# Patient Record
Sex: FEMALE | Race: BLACK OR AFRICAN AMERICAN | NOT HISPANIC OR LATINO | URBAN - METROPOLITAN AREA
[De-identification: names, ages, dates, MRNs, and addresses within clinical notes are randomized per-mention and may not be internally consistent; named-entity substitution may affect disease eponyms.]

---

## 2018-11-16 ENCOUNTER — EMERGENCY (EMERGENCY)
Facility: HOSPITAL | Age: 38
LOS: 1 days | Discharge: HOME | End: 2018-11-16
Attending: EMERGENCY MEDICINE | Admitting: EMERGENCY MEDICINE
Payer: COMMERCIAL

## 2018-11-16 VITALS
SYSTOLIC BLOOD PRESSURE: 131 MMHG | OXYGEN SATURATION: 100 % | RESPIRATION RATE: 18 BRPM | TEMPERATURE: 99 F | HEART RATE: 77 BPM | DIASTOLIC BLOOD PRESSURE: 86 MMHG

## 2018-11-16 VITALS
SYSTOLIC BLOOD PRESSURE: 153 MMHG | RESPIRATION RATE: 18 BRPM | DIASTOLIC BLOOD PRESSURE: 105 MMHG | OXYGEN SATURATION: 100 % | TEMPERATURE: 98 F | HEART RATE: 75 BPM

## 2018-11-16 PROCEDURE — 73552 X-RAY EXAM OF FEMUR 2/>: CPT | Mod: 26,LT

## 2018-11-16 PROCEDURE — 71101 X-RAY EXAM UNILAT RIBS/CHEST: CPT | Mod: 26,LT

## 2018-11-16 PROCEDURE — 73562 X-RAY EXAM OF KNEE 3: CPT | Mod: 26,LT

## 2018-11-16 PROCEDURE — 73502 X-RAY EXAM HIP UNI 2-3 VIEWS: CPT | Mod: 26,LT

## 2018-11-16 PROCEDURE — 73030 X-RAY EXAM OF SHOULDER: CPT | Mod: 26,LT

## 2018-11-16 PROCEDURE — 99284 EMERGENCY DEPT VISIT MOD MDM: CPT

## 2018-11-16 RX ORDER — CYCLOBENZAPRINE HYDROCHLORIDE 10 MG/1
1 TABLET, FILM COATED ORAL
Qty: 12 | Refills: 0
Start: 2018-11-16 | End: 2018-11-19

## 2018-11-16 RX ORDER — IBUPROFEN 200 MG
600 TABLET ORAL ONCE
Qty: 0 | Refills: 0 | Status: COMPLETED | OUTPATIENT
Start: 2018-11-16 | End: 2018-11-16

## 2018-11-16 RX ORDER — CYCLOBENZAPRINE HYDROCHLORIDE 10 MG/1
10 TABLET, FILM COATED ORAL ONCE
Qty: 0 | Refills: 0 | Status: COMPLETED | OUTPATIENT
Start: 2018-11-16 | End: 2018-11-16

## 2018-11-16 RX ADMIN — CYCLOBENZAPRINE HYDROCHLORIDE 10 MILLIGRAM(S): 10 TABLET, FILM COATED ORAL at 21:11

## 2018-11-16 RX ADMIN — Medication 600 MILLIGRAM(S): at 21:13
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
## 2018-11-16 NOTE — ED ADULT NURSE NOTE - NSIMPLEMENTINTERV_GEN_ALL_ED
Implemented All Universal Safety Interventions:  [place] to call system. Call bell, personal items and telephone within reach. Instruct patient to call for assistance. Room bathroom lighting operational. Non-slip footwear when patient is off stretcher. Physically safe environment: no spills, clutter or unnecessary equipment. Stretcher in lowest position, wheels locked, appropriate side rails in place.
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
## 2018-11-16 NOTE — ED PROVIDER NOTE - CONSTITUTIONAL, MLM
normal... Well appearing, well nourished, awake, alert, oriented to person, place, time/situation and in no apparent distress. GCS 15. Ambulatory.
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
## 2019-11-10 ENCOUNTER — EMERGENCY (EMERGENCY)
Facility: HOSPITAL | Age: 39
LOS: 1 days | Discharge: HOME | End: 2019-11-10
Attending: EMERGENCY MEDICINE | Admitting: EMERGENCY MEDICINE
Payer: COMMERCIAL

## 2019-11-10 VITALS
SYSTOLIC BLOOD PRESSURE: 147 MMHG | HEART RATE: 65 BPM | DIASTOLIC BLOOD PRESSURE: 86 MMHG | RESPIRATION RATE: 18 BRPM | TEMPERATURE: 98 F | OXYGEN SATURATION: 100 %

## 2019-11-10 LAB
ALBUMIN SERPL ELPH-MCNC: 4.2 G/DL — SIGNIFICANT CHANGE UP (ref 3.3–5)
ALP SERPL-CCNC: 74 U/L — SIGNIFICANT CHANGE UP (ref 40–120)
ALT FLD-CCNC: 4 U/L — SIGNIFICANT CHANGE UP (ref 4–33)
ANION GAP SERPL CALC-SCNC: 8 MMO/L — SIGNIFICANT CHANGE UP (ref 7–14)
ANISOCYTOSIS BLD QL: SLIGHT — SIGNIFICANT CHANGE UP
APTT BLD: 35.5 SEC — SIGNIFICANT CHANGE UP (ref 27.5–36.3)
AST SERPL-CCNC: 17 U/L — SIGNIFICANT CHANGE UP (ref 4–32)
BASOPHILS # BLD AUTO: 0.04 K/UL — SIGNIFICANT CHANGE UP (ref 0–0.2)
BASOPHILS NFR BLD AUTO: 0.5 % — SIGNIFICANT CHANGE UP (ref 0–2)
BASOPHILS NFR SPEC: 0.9 % — SIGNIFICANT CHANGE UP (ref 0–2)
BILIRUB SERPL-MCNC: 0.3 MG/DL — SIGNIFICANT CHANGE UP (ref 0.2–1.2)
BLASTS # FLD: 0 % — SIGNIFICANT CHANGE UP (ref 0–0)
BUN SERPL-MCNC: 9 MG/DL — SIGNIFICANT CHANGE UP (ref 7–23)
CALCIUM SERPL-MCNC: 9.4 MG/DL — SIGNIFICANT CHANGE UP (ref 8.4–10.5)
CHLORIDE SERPL-SCNC: 106 MMOL/L — SIGNIFICANT CHANGE UP (ref 98–107)
CO2 SERPL-SCNC: 25 MMOL/L — SIGNIFICANT CHANGE UP (ref 22–31)
CREAT SERPL-MCNC: 0.86 MG/DL — SIGNIFICANT CHANGE UP (ref 0.5–1.3)
DACRYOCYTES BLD QL SMEAR: SLIGHT — SIGNIFICANT CHANGE UP
EOSINOPHIL # BLD AUTO: 0.17 K/UL — SIGNIFICANT CHANGE UP (ref 0–0.5)
EOSINOPHIL NFR BLD AUTO: 2.1 % — SIGNIFICANT CHANGE UP (ref 0–6)
EOSINOPHIL NFR FLD: 2.7 % — SIGNIFICANT CHANGE UP (ref 0–6)
GIANT PLATELETS BLD QL SMEAR: PRESENT — SIGNIFICANT CHANGE UP
GLUCOSE SERPL-MCNC: 101 MG/DL — HIGH (ref 70–99)
HCG SERPL-ACNC: < 5 MIU/ML — SIGNIFICANT CHANGE UP
HCT VFR BLD CALC: 30.7 % — LOW (ref 34.5–45)
HGB BLD-MCNC: 8.7 G/DL — LOW (ref 11.5–15.5)
HYPOCHROMIA BLD QL: SIGNIFICANT CHANGE UP
IMM GRANULOCYTES NFR BLD AUTO: 0.2 % — SIGNIFICANT CHANGE UP (ref 0–1.5)
INR BLD: 1.18 — HIGH (ref 0.88–1.17)
LYMPHOCYTES # BLD AUTO: 1.78 K/UL — SIGNIFICANT CHANGE UP (ref 1–3.3)
LYMPHOCYTES # BLD AUTO: 22.2 % — SIGNIFICANT CHANGE UP (ref 13–44)
LYMPHOCYTES NFR SPEC AUTO: 24.8 % — SIGNIFICANT CHANGE UP (ref 13–44)
MACROCYTES BLD QL: SLIGHT — SIGNIFICANT CHANGE UP
MCHC RBC-ENTMCNC: 20.6 PG — LOW (ref 27–34)
MCHC RBC-ENTMCNC: 28.3 % — LOW (ref 32–36)
MCV RBC AUTO: 72.7 FL — LOW (ref 80–100)
METAMYELOCYTES # FLD: 0 % — SIGNIFICANT CHANGE UP (ref 0–1)
MICROCYTES BLD QL: SLIGHT — SIGNIFICANT CHANGE UP
MONOCYTES # BLD AUTO: 0.49 K/UL — SIGNIFICANT CHANGE UP (ref 0–0.9)
MONOCYTES NFR BLD AUTO: 6.1 % — SIGNIFICANT CHANGE UP (ref 2–14)
MONOCYTES NFR BLD: 2.8 % — SIGNIFICANT CHANGE UP (ref 2–9)
MYELOCYTES NFR BLD: 0 % — SIGNIFICANT CHANGE UP (ref 0–0)
NEUTROPHIL AB SER-ACNC: 62.4 % — SIGNIFICANT CHANGE UP (ref 43–77)
NEUTROPHILS # BLD AUTO: 5.52 K/UL — SIGNIFICANT CHANGE UP (ref 1.8–7.4)
NEUTROPHILS NFR BLD AUTO: 68.9 % — SIGNIFICANT CHANGE UP (ref 43–77)
NEUTS BAND # BLD: 0 % — SIGNIFICANT CHANGE UP (ref 0–6)
NRBC # FLD: 0 K/UL — SIGNIFICANT CHANGE UP (ref 0–0)
OTHER - HEMATOLOGY %: 1.8 — SIGNIFICANT CHANGE UP
OVALOCYTES BLD QL SMEAR: SLIGHT — SIGNIFICANT CHANGE UP
PLATELET # BLD AUTO: 300 K/UL — SIGNIFICANT CHANGE UP (ref 150–400)
PLATELET COUNT - ESTIMATE: NORMAL — SIGNIFICANT CHANGE UP
PMV BLD: SIGNIFICANT CHANGE UP FL (ref 7–13)
POIKILOCYTOSIS BLD QL AUTO: SIGNIFICANT CHANGE UP
POLYCHROMASIA BLD QL SMEAR: SLIGHT — SIGNIFICANT CHANGE UP
POTASSIUM SERPL-MCNC: 3.8 MMOL/L — SIGNIFICANT CHANGE UP (ref 3.5–5.3)
POTASSIUM SERPL-SCNC: 3.8 MMOL/L — SIGNIFICANT CHANGE UP (ref 3.5–5.3)
PROMYELOCYTES # FLD: 0 % — SIGNIFICANT CHANGE UP (ref 0–0)
PROT SERPL-MCNC: 7.8 G/DL — SIGNIFICANT CHANGE UP (ref 6–8.3)
PROTHROM AB SERPL-ACNC: 13.5 SEC — HIGH (ref 9.8–13.1)
RBC # BLD: 4.22 M/UL — SIGNIFICANT CHANGE UP (ref 3.8–5.2)
RBC # FLD: 20.4 % — HIGH (ref 10.3–14.5)
SCHISTOCYTES BLD QL AUTO: SLIGHT — SIGNIFICANT CHANGE UP
SMUDGE CELLS # BLD: PRESENT — SIGNIFICANT CHANGE UP
SODIUM SERPL-SCNC: 139 MMOL/L — SIGNIFICANT CHANGE UP (ref 135–145)
TARGETS BLD QL SMEAR: SLIGHT — SIGNIFICANT CHANGE UP
TROPONIN T, HIGH SENSITIVITY: < 6 NG/L — SIGNIFICANT CHANGE UP (ref ?–14)
VARIANT LYMPHS # BLD: 4.6 % — SIGNIFICANT CHANGE UP
WBC # BLD: 8.02 K/UL — SIGNIFICANT CHANGE UP (ref 3.8–10.5)
WBC # FLD AUTO: 8.02 K/UL — SIGNIFICANT CHANGE UP (ref 3.8–10.5)

## 2019-11-10 PROCEDURE — 99285 EMERGENCY DEPT VISIT HI MDM: CPT | Mod: 25

## 2019-11-10 PROCEDURE — 93010 ELECTROCARDIOGRAM REPORT: CPT

## 2019-11-10 PROCEDURE — 71275 CT ANGIOGRAPHY CHEST: CPT | Mod: 26

## 2019-11-10 RX ORDER — ACETAMINOPHEN 500 MG
650 TABLET ORAL ONCE
Refills: 0 | Status: COMPLETED | OUTPATIENT
Start: 2019-11-10 | End: 2019-11-10

## 2019-11-10 RX ORDER — MORPHINE SULFATE 50 MG/1
2 CAPSULE, EXTENDED RELEASE ORAL ONCE
Refills: 0 | Status: DISCONTINUED | OUTPATIENT
Start: 2019-11-10 | End: 2019-11-10

## 2019-11-10 RX ADMIN — Medication 650 MILLIGRAM(S): at 20:43

## 2019-11-10 RX ADMIN — MORPHINE SULFATE 2 MILLIGRAM(S): 50 CAPSULE, EXTENDED RELEASE ORAL at 21:28

## 2019-11-10 NOTE — ED ADULT NURSE NOTE - OBJECTIVE STATEMENT
pt brought into intake room 5 A&OX4 amb self care female presents to the ed today for left sided non radiating chest pain. left sided with pleuritic pain.   No associated nausea/vomit/sob or palpitations. no significant medical hx. 18g iv placed in right ac. labs drawn and sent,

## 2019-11-10 NOTE — ED PROVIDER NOTE - NSFOLLOWUPINSTRUCTIONS_ED_ALL_ED_FT
Have blood pressure rechecked within one week. It was high in ED  Return to ED for any worsening of symptoms  Follow up with own doctor for low hemoglobin.

## 2019-11-10 NOTE — ED PROVIDER NOTE - NSFOLLOWUPCLINICS_GEN_ALL_ED_FT
[facility]  Cardiology  [address] [place] [id number]  Phone: [phone]  Fax:   Follow Up Time:

## 2019-11-10 NOTE — ED PROVIDER NOTE - PHYSICAL EXAMINATION
ambulatory, non toxic female. mmm.  normal S1-S2 tender under the left breast. no rash on chest wall.   No resp distress. able to speak in full and clear sentences. no wheeze, rales or stridor.  soft nontender abdomen. no  rebound. no guarding. no sign of trauma. no CVAT   no pedal edema. no calf tenderness. normal pulses bilateral feet.  AO3

## 2019-11-10 NOTE — ED PROVIDER NOTE - PATIENT PORTAL LINK FT
You can access the FollowMyHealth Patient Portal offered by [facility] by registering at the following website: [email or web address]. By joining [employer]’s FollowMyHealth portal, you will also be able to view your health information using other applications (apps) compatible with our system.

## 2019-11-10 NOTE — ED PROVIDER NOTE - PROGRESS NOTE DETAILS
pt states she has a hx of anemia with hemoglobin as low as 6 and max of 8. pt states she is on iron. never had a transfusion. informed of hemoglobin today, states "that's normal for me" pending rest of labs and ct to be done. pt no distress aware pending ct read pt no sob, cp ok. Given copies of results. CTneg for PE. Advise close pmd followup. Given cardiology list and number. pt comfortable w dc home. Advise recheck BP in one week

## 2019-11-10 NOTE — ED PROVIDER NOTE - OBJECTIVE STATEMENT
38yo F no sig pmhx pw chest pain. Located on the left side since this morning, +pleuritic, non exertional, non radiating, constant pain today. No associated nausea/vomit/sob or palpitations. no travel. no trauma. +IUD. non smoker.no drug use. no vaping.  no sig family hx.  no abdominal pain. Hasn't taken any pain meds.

## 2019-11-11 VITALS
OXYGEN SATURATION: 100 % | DIASTOLIC BLOOD PRESSURE: 79 MMHG | HEART RATE: 62 BPM | TEMPERATURE: 100 F | RESPIRATION RATE: 17 BRPM | SYSTOLIC BLOOD PRESSURE: 121 MMHG

## 2021-02-23 ENCOUNTER — APPOINTMENT (OUTPATIENT)
Dept: VASCULAR SURGERY | Facility: CLINIC | Age: 41
End: 2021-02-23
Payer: COMMERCIAL

## 2021-02-23 ENCOUNTER — NON-APPOINTMENT (OUTPATIENT)
Age: 41
End: 2021-02-23

## 2021-02-23 VITALS
SYSTOLIC BLOOD PRESSURE: 135 MMHG | DIASTOLIC BLOOD PRESSURE: 84 MMHG | HEART RATE: 80 BPM | WEIGHT: 162 LBS | BODY MASS INDEX: 24.55 KG/M2 | HEIGHT: 68 IN

## 2021-02-23 VITALS — TEMPERATURE: 97.1 F

## 2021-02-23 PROCEDURE — 93978 VASCULAR STUDY: CPT

## 2021-02-23 PROCEDURE — 99203 OFFICE O/P NEW LOW 30 MIN: CPT

## 2021-02-23 PROCEDURE — 99072 ADDL SUPL MATRL&STAF TM PHE: CPT

## 2021-02-23 NOTE — PHYSICAL EXAM
[JVD] : no jugular venous distention  [Normal Breath Sounds] : Normal breath sounds [Normal Rate and Rhythm] : normal rate and rhythm [2+] : left 2+ [Varicose Veins Of Lower Extremities] : not present [Ankle Swelling (On Exam)] : not present [] : not present [Abdomen Tenderness] : ~T ~M No abdominal tenderness [No Rash or Lesion] : No rash or lesion [Skin Ulcer] : no ulcer [Alert] : alert [Calm] : calm [de-identified] : Appears well

## 2021-02-23 NOTE — ASSESSMENT
[FreeTextEntry1] : In the office today patient underwent an aortic duplex study which does confirm the infrarenal dissection.  Patient with no evidence of ischemia at this time.  She will return to the office after retrieving the outside CT scan.  For now would recommend follow-up in 6 months with aortic duplex.  No need to start antihypertensives as patient does not have hypertension at this time.

## 2021-02-23 NOTE — HISTORY OF PRESENT ILLNESS
[FreeTextEntry1] : 41-year-old female with no significant medical history presents to the office with a report of a infrarenal aortic dissection.  The initial CT scan was performed for evaluation of fibroids.  Patient has no history of trauma.  She has no history of hypertension aside from hypertension during pregnancy several years ago.  Patient states she never had any episodes of back or abdominal pain.  There is no evidence that the patient has a history of Marfan syndrome or any other connective tissue disorders.  She is here for evaluation.

## 2021-07-20 ENCOUNTER — APPOINTMENT (OUTPATIENT)
Dept: VASCULAR SURGERY | Facility: CLINIC | Age: 41
End: 2021-07-20
Payer: COMMERCIAL

## 2021-07-20 ENCOUNTER — OUTPATIENT (OUTPATIENT)
Dept: OUTPATIENT SERVICES | Facility: HOSPITAL | Age: 41
LOS: 1 days | End: 2021-07-20
Payer: COMMERCIAL

## 2021-07-20 VITALS
WEIGHT: 160 LBS | SYSTOLIC BLOOD PRESSURE: 127 MMHG | HEIGHT: 68 IN | BODY MASS INDEX: 24.25 KG/M2 | HEART RATE: 77 BPM | DIASTOLIC BLOOD PRESSURE: 86 MMHG

## 2021-07-20 VITALS
DIASTOLIC BLOOD PRESSURE: 86 MMHG | RESPIRATION RATE: 18 BRPM | HEART RATE: 65 BPM | TEMPERATURE: 99 F | OXYGEN SATURATION: 97 % | SYSTOLIC BLOOD PRESSURE: 142 MMHG | WEIGHT: 156.09 LBS | HEIGHT: 68 IN

## 2021-07-20 DIAGNOSIS — Z01.818 ENCOUNTER FOR OTHER PREPROCEDURAL EXAMINATION: ICD-10-CM

## 2021-07-20 DIAGNOSIS — D50.0 IRON DEFICIENCY ANEMIA SECONDARY TO BLOOD LOSS (CHRONIC): ICD-10-CM

## 2021-07-20 DIAGNOSIS — R10.2 PELVIC AND PERINEAL PAIN: ICD-10-CM

## 2021-07-20 DIAGNOSIS — D25.9 LEIOMYOMA OF UTERUS, UNSPECIFIED: ICD-10-CM

## 2021-07-20 DIAGNOSIS — I10 ESSENTIAL (PRIMARY) HYPERTENSION: ICD-10-CM

## 2021-07-20 DIAGNOSIS — I71.00 DISSECTION OF UNSPECIFIED SITE OF AORTA: ICD-10-CM

## 2021-07-20 LAB — BLD GP AB SCN SERPL QL: SIGNIFICANT CHANGE UP

## 2021-07-20 PROCEDURE — 93978 VASCULAR STUDY: CPT

## 2021-07-20 PROCEDURE — G0463: CPT

## 2021-07-20 PROCEDURE — 99213 OFFICE O/P EST LOW 20 MIN: CPT

## 2021-07-20 RX ORDER — SODIUM CHLORIDE 9 MG/ML
3 INJECTION INTRAMUSCULAR; INTRAVENOUS; SUBCUTANEOUS EVERY 8 HOURS
Refills: 0 | Status: DISCONTINUED | OUTPATIENT
Start: 2021-07-27 | End: 2021-07-27

## 2021-07-20 NOTE — ASSESSMENT
[FreeTextEntry1] : In the office today patient underwent an aortic duplex study which does confirm the infrarenal dissection.  Patient with no evidence of ischemia at this time.  The patient states she recently was started on metoprolol by her cardiologist.  Would recommend continued therapy with a beta-blocker.  There is no contraindication to proposed fibroid surgery.  Patient to follow-up in 6 months.

## 2021-07-20 NOTE — H&P PST ADULT - ASSESSMENT
This is a 41 yr old female with PMH of Hypertension, infrarenal aortic dissection presents with leiomyoma of uterus, pelvic and perineal pain, iron deficiency anemia secondary to blood loss. Pt is scheduled for abdominal myomectomy on 07/27/2021.

## 2021-07-20 NOTE — PHYSICAL EXAM
[JVD] : no jugular venous distention  [Normal Breath Sounds] : Normal breath sounds [Normal Rate and Rhythm] : normal rate and rhythm [2+] : left 2+ [Ankle Swelling (On Exam)] : not present [Varicose Veins Of Lower Extremities] : not present [] : not present [Abdomen Tenderness] : ~T ~M No abdominal tenderness [No Rash or Lesion] : No rash or lesion [Skin Ulcer] : no ulcer [Alert] : alert [Calm] : calm [de-identified] : Appears well

## 2021-07-20 NOTE — H&P PST ADULT - NSICDXPROBLEM_GEN_ALL_CORE_FT
PROBLEM DIAGNOSES  Problem: Leiomyoma of uterus  Assessment and Plan: Abdominal myomectomy on 07/27/2021. Preoperative instructions discussed with pt and given to pt. Instructed pt that she will need someone to escort ystoscopy, left ureteroscopy, laser lithotripsy, insertion of left double-J stent and removal of nephrostomy tube home after surgery, to notify security when she arrives in the lobby of the hospital that she is here for surgery, not to eat or drink anything after midnight the night before the surgery, to avoid NSAIDS such as Ibuprofen, motrin, aleve, advil, naproxen before surgery, to take Tylenol if needed for pain, to report if she has been exposed to any one with any contagious diseases including Covid-19 or if she is exhibiting any symptoms of COVID-19, to keep appointment for COVID-19  test 3 days before surgery. Instructed about use of Chlorhexidine 4% soap before surgery. Verbalized understanding of instructions given.     Problem: Hypertension  Assessment and Plan: Instructed to continue antihypertensive meds and take with sips of water on day of surgery. To be cleared by PCP. Follow-up with PCP for management.     Problem: Aortic dissection  Assessment and Plan: Cleared by vascular surgery for proposed surgery. Instructed to continue and take Metoprolol with sips of water on the day of surgery    Problem: Pelvic and perineal pain  Assessment and Plan: Pt instructed to avoid NSAIDs 1 week before surgery.  Advised to take Tylenol as needed for pain. Stated understanding of instructions given.     Problem: Iron deficiency anemia secondary to blood loss (chronic)  Assessment and Plan: Instructed to continue Iron and to follow-up with PCP for management.

## 2021-07-20 NOTE — H&P PST ADULT - HIV STATUS
From: Dea Olvera  To: Steph Harvey MD  Sent: 1/12/2020 3:04 PM CST  Subject: Prescription Question    Last week on Dec. 30th you recommended I take the Cephalexin 500 mg. for 3 days and if it didn't work, to give you a call.  I took it for the 3 day Yes Negative/Unknown

## 2021-07-20 NOTE — H&P PST ADULT - NSICDXPASTMEDICALHX_GEN_ALL_CORE_FT
PAST MEDICAL HISTORY:  Dissection of aorta     Iron deficiency anemia secondary to blood loss (chronic)     Leiomyoma of uterus     Pelvic and perineal pain

## 2021-07-25 ENCOUNTER — APPOINTMENT (OUTPATIENT)
Dept: DISASTER EMERGENCY | Facility: CLINIC | Age: 41
End: 2021-07-25

## 2021-07-25 DIAGNOSIS — Z86.018 PERSONAL HISTORY OF OTHER BENIGN NEOPLASM: ICD-10-CM

## 2021-07-25 DIAGNOSIS — Z01.818 ENCOUNTER FOR OTHER PREPROCEDURAL EXAMINATION: ICD-10-CM

## 2021-07-25 DIAGNOSIS — I77.73 DISSECTION OF RENAL ARTERY: ICD-10-CM

## 2021-07-25 PROBLEM — D50.0 IRON DEFICIENCY ANEMIA SECONDARY TO BLOOD LOSS (CHRONIC): Chronic | Status: ACTIVE | Noted: 2021-07-20

## 2021-07-25 PROBLEM — D25.9 LEIOMYOMA OF UTERUS, UNSPECIFIED: Chronic | Status: ACTIVE | Noted: 2021-07-20

## 2021-07-25 PROBLEM — R10.2 PELVIC AND PERINEAL PAIN: Chronic | Status: ACTIVE | Noted: 2021-07-20

## 2021-07-25 PROBLEM — I71.00 DISSECTION OF UNSPECIFIED SITE OF AORTA: Chronic | Status: ACTIVE | Noted: 2021-07-20

## 2021-07-26 ENCOUNTER — TRANSCRIPTION ENCOUNTER (OUTPATIENT)
Age: 41
End: 2021-07-26

## 2021-07-26 LAB — SARS-COV-2 N GENE NPH QL NAA+PROBE: NOT DETECTED

## 2021-07-27 ENCOUNTER — RESULT REVIEW (OUTPATIENT)
Age: 41
End: 2021-07-27

## 2021-07-27 ENCOUNTER — INPATIENT (INPATIENT)
Facility: HOSPITAL | Age: 41
LOS: 1 days | Discharge: HOME | DRG: 743 | End: 2021-07-29
Attending: OBSTETRICS & GYNECOLOGY | Admitting: OBSTETRICS & GYNECOLOGY
Payer: COMMERCIAL

## 2021-07-27 VITALS
TEMPERATURE: 98 F | RESPIRATION RATE: 16 BRPM | DIASTOLIC BLOOD PRESSURE: 82 MMHG | OXYGEN SATURATION: 100 % | SYSTOLIC BLOOD PRESSURE: 142 MMHG | HEIGHT: 68 IN | WEIGHT: 156.09 LBS | HEART RATE: 67 BPM

## 2021-07-27 DIAGNOSIS — D25.9 LEIOMYOMA OF UTERUS, UNSPECIFIED: ICD-10-CM

## 2021-07-27 DIAGNOSIS — D50.0 IRON DEFICIENCY ANEMIA SECONDARY TO BLOOD LOSS (CHRONIC): ICD-10-CM

## 2021-07-27 DIAGNOSIS — R10.2 PELVIC AND PERINEAL PAIN: ICD-10-CM

## 2021-07-27 LAB
BLD GP AB SCN SERPL QL: SIGNIFICANT CHANGE UP
HCG UR QL: NEGATIVE — SIGNIFICANT CHANGE UP

## 2021-07-27 PROCEDURE — 88305 TISSUE EXAM BY PATHOLOGIST: CPT | Mod: 26

## 2021-07-27 RX ORDER — ACETAMINOPHEN 500 MG
1000 TABLET ORAL EVERY 6 HOURS
Refills: 0 | Status: DISCONTINUED | OUTPATIENT
Start: 2021-07-27 | End: 2021-07-29

## 2021-07-27 RX ORDER — BENZOYL PEROXIDE MICRONIZED 5.8 %
5 TOWELETTE (EA) TOPICAL
Qty: 0 | Refills: 0 | DISCHARGE

## 2021-07-27 RX ORDER — PREGABALIN 225 MG/1
1 CAPSULE ORAL
Qty: 0 | Refills: 0 | DISCHARGE

## 2021-07-27 RX ORDER — METOPROLOL TARTRATE 50 MG
1 TABLET ORAL
Qty: 0 | Refills: 0 | DISCHARGE

## 2021-07-27 RX ORDER — SODIUM CHLORIDE 9 MG/ML
1000 INJECTION, SOLUTION INTRAVENOUS
Refills: 0 | Status: DISCONTINUED | OUTPATIENT
Start: 2021-07-27 | End: 2021-07-29

## 2021-07-27 RX ORDER — ASCORBIC ACID 60 MG
1 TABLET,CHEWABLE ORAL
Qty: 0 | Refills: 0 | DISCHARGE

## 2021-07-27 RX ORDER — CHOLECALCIFEROL (VITAMIN D3) 125 MCG
2 CAPSULE ORAL
Qty: 0 | Refills: 0 | DISCHARGE

## 2021-07-27 RX ORDER — ONDANSETRON 8 MG/1
8 TABLET, FILM COATED ORAL EVERY 8 HOURS
Refills: 0 | Status: DISCONTINUED | OUTPATIENT
Start: 2021-07-27 | End: 2021-07-29

## 2021-07-27 RX ORDER — ENOXAPARIN SODIUM 100 MG/ML
40 INJECTION SUBCUTANEOUS DAILY
Refills: 0 | Status: DISCONTINUED | OUTPATIENT
Start: 2021-07-27 | End: 2021-07-29

## 2021-07-27 RX ORDER — HYDROMORPHONE HYDROCHLORIDE 2 MG/ML
1 INJECTION INTRAMUSCULAR; INTRAVENOUS; SUBCUTANEOUS
Refills: 0 | Status: DISCONTINUED | OUTPATIENT
Start: 2021-07-27 | End: 2021-07-27

## 2021-07-27 RX ORDER — OXYCODONE HYDROCHLORIDE 5 MG/1
5 TABLET ORAL EVERY 4 HOURS
Refills: 0 | Status: DISCONTINUED | OUTPATIENT
Start: 2021-07-27 | End: 2021-07-29

## 2021-07-27 RX ORDER — HYDROMORPHONE HYDROCHLORIDE 2 MG/ML
0.5 INJECTION INTRAMUSCULAR; INTRAVENOUS; SUBCUTANEOUS
Refills: 0 | Status: DISCONTINUED | OUTPATIENT
Start: 2021-07-27 | End: 2021-07-27

## 2021-07-27 RX ORDER — IBUPROFEN 200 MG
600 TABLET ORAL EVERY 6 HOURS
Refills: 0 | Status: DISCONTINUED | OUTPATIENT
Start: 2021-07-27 | End: 2021-07-29

## 2021-07-27 RX ORDER — KETOROLAC TROMETHAMINE 30 MG/ML
15 SYRINGE (ML) INJECTION EVERY 8 HOURS
Refills: 0 | Status: DISCONTINUED | OUTPATIENT
Start: 2021-07-27 | End: 2021-07-28

## 2021-07-27 RX ORDER — SIMETHICONE 80 MG/1
80 TABLET, CHEWABLE ORAL EVERY 6 HOURS
Refills: 0 | Status: DISCONTINUED | OUTPATIENT
Start: 2021-07-27 | End: 2021-07-29

## 2021-07-27 RX ADMIN — Medication 1000 MILLIGRAM(S): at 23:13

## 2021-07-27 RX ADMIN — HYDROMORPHONE HYDROCHLORIDE 0.5 MILLIGRAM(S): 2 INJECTION INTRAMUSCULAR; INTRAVENOUS; SUBCUTANEOUS at 19:05

## 2021-07-27 RX ADMIN — HYDROMORPHONE HYDROCHLORIDE 0.5 MILLIGRAM(S): 2 INJECTION INTRAMUSCULAR; INTRAVENOUS; SUBCUTANEOUS at 18:49

## 2021-07-27 RX ADMIN — Medication 1000 MILLIGRAM(S): at 23:10

## 2021-07-27 RX ADMIN — Medication 15 MILLIGRAM(S): at 22:00

## 2021-07-27 RX ADMIN — SODIUM CHLORIDE 3 MILLILITER(S): 9 INJECTION INTRAMUSCULAR; INTRAVENOUS; SUBCUTANEOUS at 11:48

## 2021-07-27 RX ADMIN — Medication 15 MILLIGRAM(S): at 21:39

## 2021-07-27 NOTE — CHART NOTE - NSCHARTNOTEFT_GEN_A_CORE
Pt seen at bedside resting comfortably and offers no complaints.  Kellogg catheter in place draining adequate urine. Pt denies CP, SOB, N/V, dizziness, palpitations or any other complaints.    T(C): 36.7 (07-27-21 @ 20:12), Max: 36.9 (07-27-21 @ 11:48)  HR: 79 (07-27-21 @ 20:12) (53 - 79)  BP: 142/78 (07-27-21 @ 20:12) (129/68 - 143/72)  RR: 18 (07-27-21 @ 20:12) (15 - 25)  SpO2: 99% (07-27-21 @ 20:12) (95% - 100%)    abd: soft/nt, dressing in place C/D/I  pelvic: no vaginal bleeding  ext: venodynes in place; no calf pain    A/p:  POD #0 s/p abdominal myomectomy, pt stable   -cont close monitor   -cont post op care  -cbc/bmp am  -dc kellogg am   -lovenox dvt ppx  -d/w Dr. Vicente

## 2021-07-27 NOTE — BRIEF OPERATIVE NOTE - OPERATION/FINDINGS
Exam under anesthesia notable for 18w sized uterus. Intraoperative findings notable for approximately 12cm anterior fundal pedunculated fibroid, 2cm anterior intramural fibroid (not resected) and normal appearing uterus and bilateral ovaries and fallopian tubes. Good hemostasis appreciated following the procedure.

## 2021-07-27 NOTE — ASU PREOP CHECKLIST - BLOOD AVAILABLE
Hpi Title: Evaluation of Skin Lesions How Severe Are Your Spot(S)?: mild Have Your Spot(S) Been Treated In The Past?: has not been treated yes

## 2021-07-28 DIAGNOSIS — D25.9 LEIOMYOMA OF UTERUS, UNSPECIFIED: ICD-10-CM

## 2021-07-28 LAB
ANION GAP SERPL CALC-SCNC: 7 MMOL/L — SIGNIFICANT CHANGE UP (ref 5–17)
BASOPHILS # BLD AUTO: 0.02 K/UL — SIGNIFICANT CHANGE UP (ref 0–0.2)
BASOPHILS NFR BLD AUTO: 0.2 % — SIGNIFICANT CHANGE UP (ref 0–2)
BUN SERPL-MCNC: 12 MG/DL — SIGNIFICANT CHANGE UP (ref 7–18)
CALCIUM SERPL-MCNC: 9.7 MG/DL — SIGNIFICANT CHANGE UP (ref 8.4–10.5)
CHLORIDE SERPL-SCNC: 107 MMOL/L — SIGNIFICANT CHANGE UP (ref 96–108)
CO2 SERPL-SCNC: 25 MMOL/L — SIGNIFICANT CHANGE UP (ref 22–31)
COVID-19 SPIKE DOMAIN AB INTERP: POSITIVE
COVID-19 SPIKE DOMAIN ANTIBODY RESULT: 213 U/ML — HIGH
CREAT SERPL-MCNC: 1 MG/DL — SIGNIFICANT CHANGE UP (ref 0.5–1.3)
EOSINOPHIL # BLD AUTO: 0.01 K/UL — SIGNIFICANT CHANGE UP (ref 0–0.5)
EOSINOPHIL NFR BLD AUTO: 0.1 % — SIGNIFICANT CHANGE UP (ref 0–6)
GLUCOSE SERPL-MCNC: 117 MG/DL — HIGH (ref 70–99)
HCT VFR BLD CALC: 34.1 % — LOW (ref 34.5–45)
HGB BLD-MCNC: 10.5 G/DL — LOW (ref 11.5–15.5)
IMM GRANULOCYTES NFR BLD AUTO: 0.5 % — SIGNIFICANT CHANGE UP (ref 0–1.5)
LYMPHOCYTES # BLD AUTO: 1.21 K/UL — SIGNIFICANT CHANGE UP (ref 1–3.3)
LYMPHOCYTES # BLD AUTO: 9.3 % — LOW (ref 13–44)
MCHC RBC-ENTMCNC: 25.4 PG — LOW (ref 27–34)
MCHC RBC-ENTMCNC: 30.8 GM/DL — LOW (ref 32–36)
MCV RBC AUTO: 82.4 FL — SIGNIFICANT CHANGE UP (ref 80–100)
MONOCYTES # BLD AUTO: 1.2 K/UL — HIGH (ref 0–0.9)
MONOCYTES NFR BLD AUTO: 9.3 % — SIGNIFICANT CHANGE UP (ref 2–14)
NEUTROPHILS # BLD AUTO: 10.47 K/UL — HIGH (ref 1.8–7.4)
NEUTROPHILS NFR BLD AUTO: 80.6 % — HIGH (ref 43–77)
NRBC # BLD: 0 /100 WBCS — SIGNIFICANT CHANGE UP (ref 0–0)
PLATELET # BLD AUTO: 347 K/UL — SIGNIFICANT CHANGE UP (ref 150–400)
POTASSIUM SERPL-MCNC: 3.8 MMOL/L — SIGNIFICANT CHANGE UP (ref 3.5–5.3)
POTASSIUM SERPL-SCNC: 3.8 MMOL/L — SIGNIFICANT CHANGE UP (ref 3.5–5.3)
RBC # BLD: 4.14 M/UL — SIGNIFICANT CHANGE UP (ref 3.8–5.2)
RBC # FLD: 16.2 % — HIGH (ref 10.3–14.5)
SARS-COV-2 IGG+IGM SERPL QL IA: 213 U/ML — HIGH
SARS-COV-2 IGG+IGM SERPL QL IA: POSITIVE
SODIUM SERPL-SCNC: 139 MMOL/L — SIGNIFICANT CHANGE UP (ref 135–145)
WBC # BLD: 12.97 K/UL — HIGH (ref 3.8–10.5)
WBC # FLD AUTO: 12.97 K/UL — HIGH (ref 3.8–10.5)

## 2021-07-28 RX ORDER — METOPROLOL TARTRATE 50 MG
25 TABLET ORAL
Refills: 0 | Status: DISCONTINUED | OUTPATIENT
Start: 2021-07-28 | End: 2021-07-28

## 2021-07-28 RX ORDER — METOPROLOL TARTRATE 50 MG
25 TABLET ORAL
Refills: 0 | Status: DISCONTINUED | OUTPATIENT
Start: 2021-07-28 | End: 2021-07-29

## 2021-07-28 RX ADMIN — ENOXAPARIN SODIUM 40 MILLIGRAM(S): 100 INJECTION SUBCUTANEOUS at 11:14

## 2021-07-28 RX ADMIN — Medication 25 MILLIGRAM(S): at 17:00

## 2021-07-28 RX ADMIN — Medication 1000 MILLIGRAM(S): at 05:26

## 2021-07-28 RX ADMIN — Medication 15 MILLIGRAM(S): at 05:26

## 2021-07-28 RX ADMIN — Medication 15 MILLIGRAM(S): at 14:29

## 2021-07-28 RX ADMIN — OXYCODONE HYDROCHLORIDE 5 MILLIGRAM(S): 5 TABLET ORAL at 22:13

## 2021-07-28 RX ADMIN — Medication 1000 MILLIGRAM(S): at 05:58

## 2021-07-28 RX ADMIN — OXYCODONE HYDROCHLORIDE 5 MILLIGRAM(S): 5 TABLET ORAL at 21:22

## 2021-07-28 RX ADMIN — Medication 15 MILLIGRAM(S): at 05:59

## 2021-07-28 RX ADMIN — Medication 15 MILLIGRAM(S): at 15:00

## 2021-07-28 NOTE — PROGRESS NOTE ADULT - PROBLEM SELECTOR PLAN 1
-cont pain management  -follow up cbc/bmp  -dc kellogg  -pain mgmt   -advance diet to regular after flatus  -oob, encourage ambulation  -lovenox/ caro bootd DVT ppx  -discharge home   -d/w Dr. iVcente

## 2021-07-28 NOTE — PROGRESS NOTE ADULT - SUBJECTIVE AND OBJECTIVE BOX
Patient seen at bedside resting comfortably offers no new complaints. + Ambulation, voiding without difficulty, + flatus; no bm; tolerating regular diet. Denies CP, SOB, N/V/D, dizziness, palpitations, vaginal bleeding.     Vital Signs Last 24 Hrs  T(C): 36.6 (28 Jul 2021 06:00), Max: 36.8 (27 Jul 2021 19:10)  T(F): 97.9 (28 Jul 2021 06:00), Max: 98.2 (27 Jul 2021 19:10)  HR: 53 (28 Jul 2021 06:00) (53 - 79)  BP: 117/67 (28 Jul 2021 06:00) (117/67 - 143/72)  BP(mean): 87 (27 Jul 2021 19:40) (81 - 91)  RR: 18 (28 Jul 2021 06:00) (15 - 25)  SpO2: 100% (28 Jul 2021 06:00) (95% - 100%)    Gen: A&O x 3, NAD  Chest: CTA B/L  Cardiac: S1,S2  RRR  Abdomen: +BS; soft; Nontender, nondistended, dressing removed incision C/D/I w steri strips in place  Gyn: no vaginal bleeding   Extremities: Nontender, no worsening edema       Patient seen at bedside resting comfortably offers no new complaints. + Ambulation, voiding without difficulty, + flatus; no bm; tolerating regular diet. Denies CP, SOB, N/V/D, dizziness, palpitations, vaginal bleeding.     Vital Signs Last 24 Hrs  T(C): 36.6 (28 Jul 2021 06:00), Max: 36.8 (27 Jul 2021 19:10)  T(F): 97.9 (28 Jul 2021 06:00), Max: 98.2 (27 Jul 2021 19:10)  HR: 53 (28 Jul 2021 06:00) (53 - 79)  BP: 117/67 (28 Jul 2021 06:00) (117/67 - 143/72)  BP(mean): 87 (27 Jul 2021 19:40) (81 - 91)  RR: 18 (28 Jul 2021 06:00) (15 - 25)  SpO2: 100% (28 Jul 2021 06:00) (95% - 100%)    Gen: A&O x 3, NAD  Chest: CTA B/L  Cardiac: S1,S2  RRR  Abdomen: +BS; soft; Nontender, nondistended, dressing removed incision C/D/I w steri strips in place  Gyn: no vaginal bleeding   Extremities: Nontender, no worsening edema                            10.5   12.97 )-----------( 347      ( 28 Jul 2021 12:24 )             34.1   Basic Metabolic Panel - STAT (07.28.21 @ 12:24)   Sodium, Serum: 139 mmol/L   Potassium, Serum: 3.8 mmol/L   Chloride, Serum: 107 mmol/L   Carbon Dioxide, Serum: 25 mmol/L   Anion Gap, Serum: 7 mmol/L   Blood Urea Nitrogen, Serum: 12 mg/dL   Creatinine, Serum: 1.00 mg/dL   Glucose, Serum: 117 mg/dL   Calcium, Total Serum: 9.7 mg/dL   eGFR if Non : 71: Interpretative comment   The units for eGFR are mL/min/1.73M2

## 2021-07-29 ENCOUNTER — TRANSCRIPTION ENCOUNTER (OUTPATIENT)
Age: 41
End: 2021-07-29

## 2021-07-29 VITALS
DIASTOLIC BLOOD PRESSURE: 74 MMHG | HEART RATE: 65 BPM | OXYGEN SATURATION: 100 % | RESPIRATION RATE: 17 BRPM | SYSTOLIC BLOOD PRESSURE: 116 MMHG | TEMPERATURE: 98 F

## 2021-07-29 DIAGNOSIS — D50.0 IRON DEFICIENCY ANEMIA SECONDARY TO BLOOD LOSS (CHRONIC): ICD-10-CM

## 2021-07-29 PROCEDURE — 80048 BASIC METABOLIC PNL TOTAL CA: CPT

## 2021-07-29 PROCEDURE — 86850 RBC ANTIBODY SCREEN: CPT

## 2021-07-29 PROCEDURE — 86891 AUTOLOGOUS BLOOD OP SALVAGE: CPT

## 2021-07-29 PROCEDURE — 86901 BLOOD TYPING SEROLOGIC RH(D): CPT

## 2021-07-29 PROCEDURE — C1889: CPT

## 2021-07-29 PROCEDURE — 85025 COMPLETE CBC W/AUTO DIFF WBC: CPT

## 2021-07-29 PROCEDURE — 81025 URINE PREGNANCY TEST: CPT

## 2021-07-29 PROCEDURE — C1765: CPT

## 2021-07-29 PROCEDURE — 88305 TISSUE EXAM BY PATHOLOGIST: CPT

## 2021-07-29 PROCEDURE — 36415 COLL VENOUS BLD VENIPUNCTURE: CPT

## 2021-07-29 PROCEDURE — 86900 BLOOD TYPING SEROLOGIC ABO: CPT

## 2021-07-29 PROCEDURE — 86769 SARS-COV-2 COVID-19 ANTIBODY: CPT

## 2021-07-29 RX ORDER — IBUPROFEN 200 MG
1 TABLET ORAL
Qty: 40 | Refills: 0
Start: 2021-07-29 | End: 2021-08-07

## 2021-07-29 RX ORDER — ACETAMINOPHEN 500 MG
2 TABLET ORAL
Qty: 0 | Refills: 0 | DISCHARGE

## 2021-07-29 RX ORDER — ACETAMINOPHEN 500 MG
2 TABLET ORAL
Qty: 60 | Refills: 0
Start: 2021-07-29 | End: 2021-08-07

## 2021-07-29 RX ADMIN — Medication 25 MILLIGRAM(S): at 05:47

## 2021-07-29 RX ADMIN — SIMETHICONE 80 MILLIGRAM(S): 80 TABLET, CHEWABLE ORAL at 11:59

## 2021-07-29 RX ADMIN — Medication 1000 MILLIGRAM(S): at 11:59

## 2021-07-29 RX ADMIN — OXYCODONE HYDROCHLORIDE 5 MILLIGRAM(S): 5 TABLET ORAL at 09:00

## 2021-07-29 RX ADMIN — Medication 1000 MILLIGRAM(S): at 06:18

## 2021-07-29 RX ADMIN — Medication 1000 MILLIGRAM(S): at 12:45

## 2021-07-29 RX ADMIN — OXYCODONE HYDROCHLORIDE 5 MILLIGRAM(S): 5 TABLET ORAL at 08:11

## 2021-07-29 RX ADMIN — ENOXAPARIN SODIUM 40 MILLIGRAM(S): 100 INJECTION SUBCUTANEOUS at 11:59

## 2021-07-29 RX ADMIN — Medication 1000 MILLIGRAM(S): at 05:47

## 2021-07-29 NOTE — DISCHARGE NOTE PROVIDER - NSDCCPGOAL_GEN_ALL_CORE_FT
no sex nothing in vagina no heavy lifting no pushing eat high fiber food ambulation daily as tolerated shower daily clean wound well and keep dry after; see your gynecologist in 1-2wks for follow up

## 2021-07-29 NOTE — DISCHARGE NOTE PROVIDER - NSDCCPCAREPLAN_GEN_ALL_CORE_FT
PRINCIPAL DISCHARGE DIAGNOSIS  Diagnosis: Leiomyoma of uterus  Assessment and Plan of Treatment: Leiomyoma of uterus      SECONDARY DISCHARGE DIAGNOSES  Diagnosis: Iron deficiency anemia secondary to blood loss (chronic)  Assessment and Plan of Treatment: Iron deficiency anemia secondary to blood loss (chronic)

## 2021-07-29 NOTE — PROGRESS NOTE ADULT - SUBJECTIVE AND OBJECTIVE BOX
Patient seen at bedside resting comfortably and offers no new complaints. + Ambulation, voiding without difficulty, + flatus; no bm; tolerating regular diet. Pt denies CP, SOB, N/V/D, dizziness, palpitations, vaginal bleeding or any other complaints.    Vital Signs Last 24 Hrs  T(C): 36.8 (29 Jul 2021 06:00), Max: 36.9 (28 Jul 2021 14:28)  T(F): 98.3 (29 Jul 2021 06:00), Max: 98.5 (28 Jul 2021 14:28)  HR: 61 (29 Jul 2021 06:00) (59 - 61)  BP: 123/69 (29 Jul 2021 06:00) (123/69 - 128/75)  BP(mean): --  RR: 18 (29 Jul 2021 06:00) (16 - 18)  SpO2: 100% (29 Jul 2021 06:00) (98% - 100%)    Gen: A&O x 3, NAD  Chest: CTA B/L  Cardiac: S1,S2  RRR  Abdomen: +BS; soft; Nontender, nondistended, incision C/D/I w steri strips in place  Gyn: no vaginal bleeding   Extremities: Nontender, no worsening edema                          10.5   12.97 )-----------( 347      ( 28 Jul 2021 12:24 )             34.1     07-28    139  |  107  |  12  ----------------------------<  117<H>  3.8   |  25  |  1.00    Ca    9.7      28 Jul 2021 12:24

## 2021-07-29 NOTE — DISCHARGE NOTE PROVIDER - NSDCMRMEDTOKEN_GEN_ALL_CORE_FT
acetaminophen 650 mg oral tablet, extended release: 2 tab(s) orally every 8 hours, As Needed -for mild pain   ibuprofen 600 mg oral tablet: 1 tab(s) orally every 6 hours, As Needed -for moderate pain   Metoprolol Tartrate 25 mg oral tablet: 1 tab(s) orally 2 times a day  SSS Tonic oral liquid: 5 milliliter(s) orally 2 times a day  Vitamin B12 1000 mcg oral tablet: 1 tab(s) orally once a day  Vitamin C 250 mg oral tablet: 1 tab(s) orally once a day  Vitamin D3 1000 intl units (25 mcg) oral tablet, chewable: 2 tab(s) orally once a day

## 2021-07-29 NOTE — DISCHARGE NOTE PROVIDER - CARE PROVIDER_API CALL
Lyudmila Vicente  OBSTETRICS AND GYNECOLOGY  [address] [place] [address]  Phone: [phone]  Fax: [phone]  Follow Up Time: 2 weeks

## 2021-07-29 NOTE — PROGRESS NOTE ADULT - ASSESSMENT
A/P: POD #2 s/p abdominal myomectomy  -cont pain management  -oob, encourage ambulation  -lovenox DVT ppx  -discharge home today  -follow up with Dr. Vicente in office in 1-2 weeks for post op visit   -d/w Dr. Vicente 
A/P: POD #1 s/p abd myomectomy

## 2021-07-29 NOTE — DISCHARGE NOTE NURSING/CASE MANAGEMENT/SOCIAL WORK - PATIENT PORTAL LINK FT
You can access the FollowMyHealth Patient Portal offered by [facility] by registering at the following website: [email or web address]. By joining [employer]’s FollowMyHealth portal, you will also be able to view your health information using other applications (apps) compatible with our system.

## 2021-07-30 LAB — SURGICAL PATHOLOGY STUDY: SIGNIFICANT CHANGE UP

## 2022-02-01 ENCOUNTER — APPOINTMENT (OUTPATIENT)
Dept: VASCULAR SURGERY | Facility: CLINIC | Age: 42
End: 2022-02-01
Payer: COMMERCIAL

## 2022-02-01 VITALS — SYSTOLIC BLOOD PRESSURE: 154 MMHG | DIASTOLIC BLOOD PRESSURE: 93 MMHG | HEART RATE: 66 BPM

## 2022-02-01 PROCEDURE — 93978 VASCULAR STUDY: CPT

## 2022-02-01 PROCEDURE — 99212 OFFICE O/P EST SF 10 MIN: CPT

## 2022-02-01 NOTE — HISTORY OF PRESENT ILLNESS
[FreeTextEntry1] : 41-year-old female with no significant medical history presents to the office with a report of a infrarenal aortic dissection. The initial CT scan was performed for evaluation of fibroids. Patient has no history of trauma. She is without any complaints at this time.  pt denies any history of abdominal or back pain.  pt denies any history of claudications

## 2022-02-01 NOTE — PHYSICAL EXAM
[JVD] : no jugular venous distention  [Abdominal Aorta] : Normal abdominal aorta [2+] : left 2+ [Ankle Swelling (On Exam)] : not present [Varicose Veins Of Lower Extremities] : not present [] : not present [Abdomen Masses] : No abdominal masses [Abdomen Tenderness] : ~T ~M No abdominal tenderness [No Rash or Lesion] : No rash or lesion [Skin Ulcer] : no ulcer [Alert] : alert [Calm] : calm [de-identified] : appears well

## 2022-02-01 NOTE — ASSESSMENT
[FreeTextEntry1] : 41-year-old female with no significant medical history presents to the office with a report of a infrarenal aortic dissection\par \par duplex shows infrarenal dissection of about 3 cm in length \par \par pt currently asymptomatic will continue to monitor pt to follow up in 6 months

## 2022-08-18 ENCOUNTER — APPOINTMENT (OUTPATIENT)
Dept: VASCULAR SURGERY | Facility: CLINIC | Age: 42
End: 2022-08-18

## 2022-08-18 PROCEDURE — 93978 VASCULAR STUDY: CPT

## 2022-08-18 PROCEDURE — 99213 OFFICE O/P EST LOW 20 MIN: CPT

## 2022-08-18 NOTE — HISTORY OF PRESENT ILLNESS
[FreeTextEntry1] : 42-year-old female with no significant medical history presents to the office with a report of a infrarenal aortic dissection. The initial CT scan was performed for evaluation of fibroids. Patient has no history of trauma. She is without any complaints at this time.  pt denies any history of abdominal or back pain.  pt denies any history of claudications

## 2022-08-18 NOTE — PHYSICAL EXAM
[JVD] : no jugular venous distention  [Abdominal Aorta] : Normal abdominal aorta [2+] : left 2+ [Ankle Swelling (On Exam)] : not present [Varicose Veins Of Lower Extremities] : not present [] : not present [Abdomen Masses] : No abdominal masses [Abdomen Tenderness] : ~T ~M No abdominal tenderness [No Rash or Lesion] : No rash or lesion [Skin Ulcer] : no ulcer [Alert] : alert [Calm] : calm [de-identified] : appears well

## 2022-08-18 NOTE — ASSESSMENT
[FreeTextEntry1] : 42-year-old female with no significant medical history presents to the office with a report of a infrarenal aortic dissection\par \par duplex shows infrarenal dissection of about 3 cm in length \par \par pt currently asymptomatic will continue to monitor pt to follow up in 12 months

## 2022-10-12 NOTE — ED ADULT NURSE NOTE - EXTENSIONS OF SELF_ADULT
History and Physical    Patient:  Florentino Huang  MRN: [id number]    Chief Complaint: Hypernatremia per [facility]    History Obtained From:  electronic medical record, reason patient could not give history:  altered mental status and lack of cooperation    PCP: Miguel Georges DO    History of Present Illness: The patient is a 74 y.o. female who [facility]. They reported a sodium of 161. ER provider stated upon arrival patient appeared to be very dehydrated and was minimally responsive. Patient was breathing spontaneously without labored respirations. Her SPO2 was 95% on room air. No family members at bedside to get a clear understanding of her baseline mental status. In review of her medication list patient is on several psychotropic drugs and duplicates that include IV and/or oral delivery. Patient is also on Jardiance. Patient is on lactulose as well. Patient does have history of alcohol dependence according to medical records. Patient also has history of alcoholic cirrhosis of the liver without ascites. She was hypotensive upon arrival.  Radiology studies were negative. Patient's BUN was elevated at 72 and creatinine of 2.50. Patient was found to be hyponatremic at 157 with a chloride of 114. Past Medical History:        Diagnosis Date    Alcohol dependence in remission ([facility] [address]     Alcoholic cirrhosis of liver without ascites (HCC)     ASHD (arteriosclerotic heart disease)     Depression     Diabetes mellitus (HCC)     GERD (gastroesophageal reflux disease)     Hepatitis C     WITH FULL RECOVERY - INFECTIOUS DISEASE DR. Екатерина Jimenez - LAST VISIT 7/2020    Hyperlipidemia     Hypertension     Irregular heartbeat     Murmur, cardiac     noted by PCP, no echo per patient    Urinary bladder incontinence     Wears eyeglasses     Well adult health check     PCP - DR. Lashanda Dominique - 3/2021    Well adult health check     INFECTIOUS DISEASE - DR. Kanika Hinds    Well adult health check     GI - DR. Ally Bailey       Past Surgical History:        Procedure Laterality Date    BREAST SURGERY Right 1975    REMOVAL BENIGN TUMOR    COLONOSCOPY      HIP SURGERY Right 1964    RECONSTRUCTION    TONSILLECTOMY      AS A CHILD    TUBAL LIGATION  1975    VITRECTOMY Left 04/22/2021    VITRECTOMY Left 4/22/2021    VITRECTOMY 25 GAUGE, MEMBRANE PEEL, ICG performed by Efraín Zhang MD at [place] [address]       Medications Prior to Admission:    Prior to Admission medications    Medication Sig Start Date End Date Taking? Authorizing Provider   ziprasidone (GEODON) 20 MG injection Inject 20 mg into the muscle every 12 hours as needed for Agitation   Yes Historical Provider, MD   haloperidol lactate (HALDOL) 5 MG/ML injection Inject into the muscle every 6 hours as needed for Agitation   Yes Historical Provider, MD   haloperidol (HALDOL) 5 MG tablet Take 5 mg by mouth every 6 hours as needed for Agitation   Yes Historical Provider, MD   ARIPiprazole (ABILIFY) 15 MG tablet Take 15 mg by mouth daily   Yes Historical Provider, MD   benztropine (COGENTIN) 2 MG tablet Take 2 mg by mouth daily Tremors   Yes Historical Provider, MD   buPROPion (WELLBUTRIN XL) 150 MG extended release tablet Take 150 mg by mouth every morning   Yes Historical Provider, MD   Dentifrices (BIOTENE DRY MOUTH DT) Place 2 sprays onto teeth 4 times daily   Yes Historical Provider, MD   aspirin 81 MG EC tablet Take 81 mg by mouth daily   Yes Historical Provider, MD   empagliflozin (JARDIANCE) 25 MG tablet Take 25 mg by mouth daily   Yes Historical Provider, MD   folic acid (FOLVITE) 1 MG tablet Take 1 mg by mouth daily   Yes Historical Provider, MD   potassium chloride (KLOR-CON M) 10 MEQ extended release tablet Take 10 mEq by mouth daily For hypokalemia.    Yes Historical Provider, MD   risperiDONE (RISPERDAL) 2 MG tablet Take 2 mg by mouth 2 times daily   Yes Historical Provider, MD divalproex (DEPAKOTE SPRINKLE) 125 MG capsule Take 250 mg by mouth in the morning, at noon, and at bedtime   Yes Historical Provider, MD   insulin lispro (HUMALOG) 100 UNIT/ML injection vial Inject into the skin 4 times daily (after meals and at bedtime) Sliding scale:    0-150 =0  151-200= 2 units  201-250= 4 units  251-300= 6 units  301-350= 8 units  351-400= 10 units  401-450=12 units  451-500=14 units  >500:  Call MD   Yes Historical Provider, MD   LORazepam (ATIVAN) 2 MG/ML injection Infuse 1 mg intravenously every 6 hours as needed (anxiety). Yes Historical Provider, MD   LORazepam (ATIVAN) 1 MG tablet Take 1 mg by mouth every 6 hours as needed for Anxiety.    Yes Historical Provider, MD   Dulaglutide 1.5 MG/0.5ML SOPN Inject 1.5 mg into the skin once a week Fridays   Yes Historical Provider, MD   Multiple Vitamin TABS Take 1 tablet by mouth daily   Yes Historical Provider, MD   benztropine (COGENTIN) 1 MG tablet Take 1 mg by mouth 2 times daily   Yes Historical Provider, MD   saliva substitute (BIOTENE/MOUTH KOTE) SOLN liquid Take 2 sprays by mouth 4 times daily   Yes Historical Provider, MD   lactulose encephalopathy 10 GM/15ML SOLN solution Take 20 g by mouth in the morning, at noon, in the evening, and at bedtime   Yes Historical Provider, MD   furosemide (LASIX) 40 MG tablet Take 40 mg by mouth daily    Historical Provider, MD   vitamin B-1 (THIAMINE) 100 MG tablet Take 100 mg by mouth daily    Historical Provider, MD   pravastatin (PRAVACHOL) 40 MG tablet Take 40 mg by mouth daily    Historical Provider, MD   losartan (COZAAR) 25 MG tablet Take 25 mg by mouth daily    Historical Provider, MD   glipiZIDE (GLUCOTROL XL) 5 MG extended release tablet Take 5 mg by mouth daily    Historical Provider, MD   VORTIoxetine HBr (TRINTELLIX) 20 MG TABS tablet Take 20 mg by mouth daily    Historical Provider, MD   omeprazole (PRILOSEC) 20 MG delayed release capsule Take 20 mg by mouth nightly    Historical Provider, MD       Allergies:  Lisinopril    Social History:   TOBACCO:   reports that she quit smoking about 26 years ago. She has never used smokeless tobacco.  ETOH:   has no history on file for alcohol use. Family History:   History reviewed. No pertinent family history. Allergies:  Lisinopril    Medications Prior to Admission:    Prior to Admission medications    Medication Sig Start Date End Date Taking? Authorizing Provider   ziprasidone (GEODON) 20 MG injection Inject 20 mg into the muscle every 12 hours as needed for Agitation   Yes Historical Provider, MD   haloperidol lactate (HALDOL) 5 MG/ML injection Inject into the muscle every 6 hours as needed for Agitation   Yes Historical Provider, MD   haloperidol (HALDOL) 5 MG tablet Take 5 mg by mouth every 6 hours as needed for Agitation   Yes Historical Provider, MD   ARIPiprazole (ABILIFY) 15 MG tablet Take 15 mg by mouth daily   Yes Historical Provider, MD   benztropine (COGENTIN) 2 MG tablet Take 2 mg by mouth daily Tremors   Yes Historical Provider, MD   buPROPion (WELLBUTRIN XL) 150 MG extended release tablet Take 150 mg by mouth every morning   Yes Historical Provider, MD   Dentifrices (BIOTENE DRY MOUTH DT) Place 2 sprays onto teeth 4 times daily   Yes Historical Provider, MD   aspirin 81 MG EC tablet Take 81 mg by mouth daily   Yes Historical Provider, MD   empagliflozin (JARDIANCE) 25 MG tablet Take 25 mg by mouth daily   Yes Historical Provider, MD   folic acid (FOLVITE) 1 MG tablet Take 1 mg by mouth daily   Yes Historical Provider, MD   potassium chloride (KLOR-CON M) 10 MEQ extended release tablet Take 10 mEq by mouth daily For hypokalemia.    Yes Historical Provider, MD   risperiDONE (RISPERDAL) 2 MG tablet Take 2 mg by mouth 2 times daily   Yes Historical Provider, MD   divalproex (DEPAKOTE SPRINKLE) 125 MG capsule Take 250 mg by mouth in the morning, at noon, and at bedtime   Yes Historical Provider, MD   insulin lispro (HUMALOG) 100 None UNIT/ML injection vial Inject into the skin 4 times daily (after meals and at bedtime) Sliding scale:    0-150 =0  151-200= 2 units  201-250= 4 units  251-300= 6 units  301-350= 8 units  351-400= 10 units  401-450=12 units  451-500=14 units  >500:  Call MD   Yes Historical Provider, MD   LORazepam (ATIVAN) 2 MG/ML injection Infuse 1 mg intravenously every 6 hours as needed (anxiety). Yes Historical Provider, MD   LORazepam (ATIVAN) 1 MG tablet Take 1 mg by mouth every 6 hours as needed for Anxiety.    Yes Historical Provider, MD   Dulaglutide 1.5 MG/0.5ML SOPN Inject 1.5 mg into the skin once a week Fridays   Yes Historical Provider, MD   Multiple Vitamin TABS Take 1 tablet by mouth daily   Yes Historical Provider, MD   benztropine (COGENTIN) 1 MG tablet Take 1 mg by mouth 2 times daily   Yes Historical Provider, MD   saliva substitute (BIOTENE/MOUTH KOTE) SOLN liquid Take 2 sprays by mouth 4 times daily   Yes Historical Provider, MD   lactulose encephalopathy 10 GM/15ML SOLN solution Take 20 g by mouth in the morning, at noon, in the evening, and at bedtime   Yes Historical Provider, MD   furosemide (LASIX) 40 MG tablet Take 40 mg by mouth daily    Historical Provider, MD   vitamin B-1 (THIAMINE) 100 MG tablet Take 100 mg by mouth daily    Historical Provider, MD   pravastatin (PRAVACHOL) 40 MG tablet Take 40 mg by mouth daily    Historical Provider, MD   losartan (COZAAR) 25 MG tablet Take 25 mg by mouth daily    Historical Provider, MD   glipiZIDE (GLUCOTROL XL) 5 MG extended release tablet Take 5 mg by mouth daily    Historical Provider, MD   VORTIoxetine HBr (TRINTELLIX) 20 MG TABS tablet Take 20 mg by mouth daily    Historical Provider, MD   omeprazole (PRILOSEC) 20 MG delayed release capsule Take 20 mg by mouth nightly    Historical Provider, MD       Review of Systems: She is unable due ot AMS      Physical Exam:    Vitals:   Temp: 97.6 °F (36.4 °C)  BP: (!) 147/76  Resp: 18  Heart Rate: 87  SpO2: 97 %  24HR INTAKE/OUTPUT:    Intake/Output Summary (Last 24 hours) at 10/12/2022 1326  Last data filed at 10/12/2022 1241  Gross per 24 hour   Intake 1516.22 ml   Output 1500 ml   Net 16.22 ml       Weight    Body mass index is 24.44 kg/m². Exam:  GEN:    Awake, but drowsy and does not answer questions  EYES:  EOMI, pupils equal   NECK: Supple. No lymphadenopathy. No carotid bruit  CVS:    regular rate and rhythm, no audible murmur  PULM:  CTA, no wheezes, rales or rhonchi, no acute respiratory distress  ABD:    Bowels sounds normal.  Abdomen is soft. No distention. no tenderness to palpation. EXT:   no edema bilaterally . No calf tenderness. NEURO: Moves all extremities. Motor and sensory are grossly intact. Visible tremors   SKIN:  No rashes.   No skin lesions.    -----------------------------------------------------------------  Diagnostic Data:     DATA:    CBC:   Lab Results   Component Value Date    WBC 12.5 (H) 10/12/2022    RBC 4.85 10/12/2022    HGB 14.6 10/12/2022    HCT 46.2 10/12/2022    MCV 95.3 10/12/2022    PLT See Reflexed IPF Result 10/12/2022        CMP:   Lab Results   Component Value Date    GLUCOSE 187 (H) 10/12/2022    BUN 47 (H) 10/12/2022    CREATININE 1.28 (H) 10/12/2022     (HH) 10/12/2022    K 4.3 10/12/2022    CALCIUM 8.5 (L) 10/12/2022     (HH) 10/12/2022    CO2 24 10/12/2022    PROT 6.7 10/12/2022    LABALBU 3.5 10/12/2022    BILITOT 1.0 10/12/2022    ALKPHOS 58 10/12/2022    ALT 56 (H) 10/12/2022    AST 53 (H) 10/12/2022       UA:   Lab Results   Component Value Date    COLORU Yellow 10/11/2022    SPECGRAV 1.020 10/11/2022    WBCUA 2 TO 5 10/11/2022    RBCUA 2 TO 5 10/11/2022    EPITHUA 0 TO 2 10/11/2022    LEUKOCYTESUR NEGATIVE 10/11/2022    GLUCOSEU 3+ (A) 10/11/2022    KETUA NEGATIVE 10/11/2022    PROTEINU NEGATIVE 10/11/2022    HGBUR 1+ (A) 10/11/2022    BACTERIA 1+ (A) 10/11/2022       Lactic Acid:   No results found for: LACTA    D-Dimer:  No results found for: DDIMER    PT/INR:  Lab Results   Component Value Date/Time    PROTIME 17.3 10/11/2022 09:35 AM    INR 1.4 10/11/2022 09:35 AM       High Sensitivity Troponin:  No results for input(s): TROPHS in the last 72 hours. ABGs:   No results found for: PHART, PH, NJQ4PQA, PCO2, PO2ART, PO2, KMD5USL, HCO3, BEART, BE, THGBART, THB, PEC4ESZ, O9KEPRQA, O2SAT, FIO2        XR CHEST PORTABLE   Final Result   No acute cardiopulmonary process         CT Head W/O Contrast   Final Result   No acute intracranial abnormality. Prominent senescent changes. These appear more prominent than usually seen   in a patient of this age, but could be due to old injuries, various   medications, or history of substance abuse. EKG reviewed    Assessment:    Principal Problem:    Hypernatremia  Active Problems:    Dehydration    Major depressive disorder with psychotic features (HCC)    Type 2 diabetes mellitus without complication (HCC)    Essential hypertension    ASHD (arteriosclerotic heart disease)    Gastroesophageal reflux disease without esophagitis  Resolved Problems:    * No resolved hospital problems. *      Patient Active Problem List    Diagnosis Date Noted    Hypernatremia 10/11/2022    Dehydration 10/11/2022    Major depressive disorder with psychotic features ([facility] [address] 10/11/2022    Type 2 diabetes mellitus without complication ([facility] 79/84/9051    Essential hypertension 10/11/2022    ASHD (arteriosclerotic heart disease) 10/11/2022    Gastroesophageal reflux disease without esophagitis 10/11/2022    Macular pucker, left eye 04/22/2021       Plan:      This patient requires inpatient admission because of hypernatremia  Factors affecting the medical complexity of this patient include dehydration, type 2 diabetes, essential hypertension  Estimated length of stay is 4 days  Hypernatremia  Start D5  Stop normal saline  Trend labs  Dehydration  Continue IV fluids  Trend labs  Possibly due to oversedation, decreased oral intake, psychotropic medications including other medications such as lactulose and Jardiance  Type 2 diabetes  POCT before meals and at bedtime  Insulin sliding scale  Hypoglycemia protocol  Hold Jardiance  Essential hypertension  Continue Lopressor  DVT prophylaxis: Lovenox  Peptic ulcer prophylaxis: Pepcid  High risk medications: none  Social Service and Case Management consults for DC planning  Dietician consult initiated    CORE MEASURES  DVT prophylaxis: Lovenox  Decubitus ulcer present on admission: No  CODE STATUS: FULL CODE  Nutrition Status: fair   Physical therapy: Yes   Old Charts reviewed: Yes  EKG Reviewed:  Yes  Advance Directive Addressed: Yes    ROB Cotton - CNP, APRN, NP-C  10/12/2022, 1:26 PM

## 2023-04-27 NOTE — H&P PST ADULT - HISTORY OF PRESENT ILLNESS
This is a 41 yr old female with PMH of presents with c/o prolonged and heavy menses due to fibroids. Pt for abdominal myomectomy on 07/27/2021. This is a 41 yr old female with PMH of Hypertension, aortic dissection presents with c/o pelvic pain to fibroids. Pt for abdominal myomectomy on 07/27/2021. This is a 41 yr old female with PMH of Hypertension, infrarenal aortic dissection presents with c/o pelvic pain and anemia to fibroids. Pt for abdominal myomectomy on 07/27/2021. Bexarotene Counseling:  I discussed with the patient the risks of bexarotene including but not limited to hair loss, dry lips/skin/eyes, liver abnormalities, hyperlipidemia, pancreatitis, depression/suicidal ideation, photosensitivity, drug rash/allergic reactions, hypothyroidism, anemia, leukopenia, infection, cataracts, and teratogenicity.  Patient understands that they will need regular blood tests to check lipid profile, liver function tests, white blood cell count, thyroid function tests and pregnancy test if applicable.

## 2023-09-28 ENCOUNTER — APPOINTMENT (OUTPATIENT)
Dept: VASCULAR SURGERY | Facility: CLINIC | Age: 43
End: 2023-09-28
Payer: COMMERCIAL

## 2023-09-28 PROCEDURE — 93978 VASCULAR STUDY: CPT

## 2023-09-28 PROCEDURE — 99214 OFFICE O/P EST MOD 30 MIN: CPT

## 2024-08-13 ENCOUNTER — APPOINTMENT (OUTPATIENT)
Dept: VASCULAR SURGERY | Facility: CLINIC | Age: 44
End: 2024-08-13
Payer: COMMERCIAL

## 2024-08-13 PROCEDURE — G2211 COMPLEX E/M VISIT ADD ON: CPT | Mod: NC

## 2024-08-13 PROCEDURE — 99214 OFFICE O/P EST MOD 30 MIN: CPT | Mod: 25

## 2024-08-13 PROCEDURE — 93978 VASCULAR STUDY: CPT

## 2024-08-13 NOTE — ASSESSMENT
[FreeTextEntry1] : 44-year-old female with chronic infrarenal aortic dissection  In the office today, patient underwent duplex which demonstrates a stable chronic infrarenal dissection approximately 3 cm in length unchanged from previous study.  Given the patient remains asymptomatic, we will continue to monitor.  No vascular intervention is necessary at this time.  Patient to follow-up in 1 year. [Arterial/Venous Disease] : arterial/venous disease [Medication Management] : medication management

## 2024-08-13 NOTE — PHYSICAL EXAM
[JVD] : no jugular venous distention  [Normal Breath Sounds] : Normal breath sounds [Normal Rate and Rhythm] : normal rate and rhythm [Abdominal Aorta] : Normal abdominal aorta [2+] : left 2+ [Ankle Swelling (On Exam)] : not present [Varicose Veins Of Lower Extremities] : not present [] : not present [Abdomen Masses] : No abdominal masses [Abdomen Tenderness] : ~T ~M No abdominal tenderness [No Rash or Lesion] : No rash or lesion [Skin Ulcer] : no ulcer [Alert] : alert [Calm] : calm [de-identified] : appears well  [de-identified] : Intact

## 2025-08-19 ENCOUNTER — APPOINTMENT (OUTPATIENT)
Dept: VASCULAR SURGERY | Facility: CLINIC | Age: 45
End: 2025-08-19
Payer: COMMERCIAL

## 2025-08-19 DIAGNOSIS — I77.73 DISSECTION OF RENAL ARTERY: ICD-10-CM

## 2025-08-19 PROCEDURE — 93978 VASCULAR STUDY: CPT

## 2025-08-19 PROCEDURE — 99214 OFFICE O/P EST MOD 30 MIN: CPT
